# Patient Record
Sex: FEMALE | Race: WHITE | URBAN - METROPOLITAN AREA
[De-identification: names, ages, dates, MRNs, and addresses within clinical notes are randomized per-mention and may not be internally consistent; named-entity substitution may affect disease eponyms.]

---

## 2017-02-01 ENCOUNTER — OFFICE VISIT (OUTPATIENT)
Dept: FAMILY MEDICINE | Facility: CLINIC | Age: 22
End: 2017-02-01
Payer: COMMERCIAL

## 2017-02-01 VITALS — BODY MASS INDEX: 31.22 KG/M2 | RESPIRATION RATE: 16 BRPM | WEIGHT: 184 LBS | TEMPERATURE: 98.5 F

## 2017-02-01 DIAGNOSIS — Z71.84 TRAVEL ADVICE ENCOUNTER: Primary | ICD-10-CM

## 2017-02-01 DIAGNOSIS — Z23 NEED FOR VACCINATION: ICD-10-CM

## 2017-02-01 PROCEDURE — 90471 IMMUNIZATION ADMIN: CPT | Mod: GA | Performed by: NURSE PRACTITIONER

## 2017-02-01 PROCEDURE — 99402 PREV MED CNSL INDIV APPRX 30: CPT | Mod: 25 | Performed by: NURSE PRACTITIONER

## 2017-02-01 PROCEDURE — 90632 HEPA VACCINE ADULT IM: CPT | Mod: GA | Performed by: NURSE PRACTITIONER

## 2017-02-01 RX ORDER — SCOLOPAMINE TRANSDERMAL SYSTEM 1 MG/1
PATCH, EXTENDED RELEASE TRANSDERMAL
Qty: 2 PATCH | Refills: 0 | Status: SHIPPED
Start: 2017-02-01

## 2017-02-01 NOTE — NURSING NOTE
Screening Questionnaire for Adult Immunization    Are you sick today?   No   Do you have allergies to medications, food, a vaccine component or latex?   No   Have you ever had a serious reaction after receiving a vaccination?   No   Do you have a long-term health problem with heart disease, lung disease, asthma, kidney disease, metabolic disease (e.g. diabetes), anemia, or other blood disorder?   No   Do you have cancer, leukemia, HIV/AIDS, or any other immune system problem?   No   In the past 3 months, have you taken medications that affect  your immune system, such as prednisone, other steroids, or anticancer drugs; drugs for the treatment of rheumatoid arthritis, Crohn s disease, or psoriasis; or have you had radiation treatments?   No   Have you had a seizure, or a brain or other nervous system problem?   No   During the past year, have you received a transfusion of blood or blood     products, or been given immune (gamma) globulin or antiviral drug?   No   For women: Are you pregnant or is there a chance you could become        pregnant during the next month?   No   Have you received any vaccinations in the past 4 weeks?   No     Immunization questionnaire answers were all negative.      MNVFC doesn't apply on this patient    Per orders of JENNIFER Torrez, injection of Hep A given by Abbie Rodriguez. Patient instructed to remain in clinic for 20 minutes afterwards, and to report any adverse reaction to me immediately.       Screening performed by Abbie Rodriguez on 2/1/2017 at 1:33 PM.

## 2017-02-01 NOTE — PATIENT INSTRUCTIONS
Today February 1, 2017 you received the    Hepatitis A Vaccine - Please return on 7/31/17 or later for your 2nd and final dose.  .    These appointments can be made as a NURSE ONLY visit.    **It is very important for the vaccinations to be given on the scheduled day(s), this helps ensure you receive the full effectiveness of the vaccine.**    Please call [facility] with any questions [phone]    Thank you for visiting [facility]

## 2017-02-01 NOTE — MR AVS SNAPSHOT
After Visit Summary   2/1/2017    Elida Carrillo    MRN: [id number]           Patient Information     Date Of Birth          1995        Visit Information        Provider Department      2/1/2017 1:00 PM Krista Torrez APRN CNP [facility]        Today's Diagnoses     Travel advice encounter    -  1     Need for vaccination           Care Instructions    Today February 1, 2017 you received the    Hepatitis A Vaccine - Please return on 7/31/17 or later for your 2nd and final dose.  .    These appointments can be made as a NURSE ONLY visit.    **It is very important for the vaccinations to be given on the scheduled day(s), this helps ensure you receive the full effectiveness of the vaccine.**    Please call [facility] with any questions [phone]    Thank you for visiting [facility]            Follow-ups after your visit        Future tests that were ordered for you today     Open Future Orders        Priority Expected Expires Ordered    HEPA VACCINE ADULT IM Routine 8/1/2017 2/1/2018 2/1/2017            Who to contact     If you have questions or need follow up information about today's clinic visit or your schedule please contact [facility] directly at [phone].  Normal or non-critical lab and imaging results will be communicated to you by MyChart, letter or phone within 4 business days after the clinic has received the results. If you do not hear from us within 7 days, please contact the clinic through [employer] or phone. If you have a critical or abnormal lab result, we will notify you by phone as soon as possible.  Submit refill requests through [employer] or call your pharmacy and they will forward the refill request to us. Please allow 3 business days for your refill to be completed.          Additional Information About Your Visit        [employer] Information     [employer] gives you secure access to your electronic  health record. If you see a primary care provider, you can also send messages to your care team and make appointments. If you have questions, please call your primary care clinic.  If you do not have a primary care provider, please call [phone] and they will assist you.        Care EveryWhere ID     This is your Care EveryWhere ID. This could be used by other organizations to access your [place] medical records  [id number]        Your Vitals Were     Temperature Respirations Last Period             98.5  F (36.9  C) (Oral) 16 01/15/2017          Blood Pressure from Last 3 Encounters:   09/29/15 110/64   12/18/14 108/60   01/24/14 98/64    Weight from Last 3 Encounters:   02/01/17 184 lb (83.462 kg)   11/03/15 181 lb 3.2 oz (82.192 kg)   09/29/15 182 lb 1.6 oz (82.6 kg)              We Performed the Following     HEPA VACCINE ADULT IM        Primary Care Provider Office Phone # Fax #    LIZZ Dunlap [facility] [phone]       [facility] [id number] [facility] [id number]        Thank you!     Thank you for choosing [facility]  for your care. Our goal is always to provide you with excellent care. Hearing back from our patients is one way we can continue to improve our services. Please take a few minutes to complete the written survey that you may receive in the mail after your visit with us. Thank you!             Your Updated Medication List - Protect others around you: Learn how to safely use, store and throw away your medicines at www.disposemymeds.org.          This list is accurate as of: 2/1/17  1:19 PM.  Always use your most recent med list.                   Brand Name Dispense Instructions for use    benzoyl peroxide-erythromycin topical gel    BENZAMYCIN    23.3 g    Apply  topically 2 times daily.       BIOTIN PO          hydrocortisone 2.5 % cream    ANUSOL-HC    60 g    Place rectally 2 times daily       norgestim-eth estrad triphasic 0.18/0.215/0.25  MG-35 MCG per tablet    TRINESSA (28)    84 tablet    Take 1 tablet by mouth daily       SUMAtriptan 25 MG tablet    IMITREX    18 tablet    Take 1-2 tablets (25-50 mg) by mouth at onset of headache for migraine May repeat dose in 2 hours.  Do not exceed 200 mg in 24 hours       VITAMIN B 12 PO

## 2017-02-01 NOTE — PROGRESS NOTES
Nurse Note      Itinerary:  [place]      Departure Date: 03/18/2017      Return Date: 03/25/2017      Length of Trip 1 week       Reason for Travel: Tourism           Urban or rural: both      Accommodations: Hotel        IMMUNIZATION HISTORY  Have you received any immunizations within the past 4 weeks?  No  Have you ever fainted from having your blood drawn or from an injection?  No  Have you ever had a fever reaction to vaccination?  No  Have you ever had any bad reaction or side effect from any vaccination?  No  Have you ever had hepatitis A or B vaccine?  Yes  Do you live (or work closely) with anyone who has AIDS, an AIDS-like condition, any other immune disorder or who is on chemotherapy for cancer?  Yes  Do you have a family history of immunodeficiency?  No  Have you received any injection of immune globulin or any blood products during the past 12 months?  No    Patient roomed by CRUZ Baron  Elida JACOB Lorena is a 21 year old female seen today alone for counsultation for international travel to [facility] for [facility].  Patient will be departing in  6 week(s) and staying for   1 week(s) and  traveling with family member(s).      Patient itinerary :  will be in the resort region of [facility]  which presents risk for Dengue Fever, Chikungungya, Zika, food borne illnesses, motor vehicle accidents, Typhoid and Chagas disease. exposure.      Patient's activities will include sightseeing, beach activities (salt water), snorkeling and fishing.    Patient's country of birth is [place]    Special medical concerns: Motion sickness  Pre-travel questionnaire was completed by patient and reviewed by provider.     Vitals: There were no vitals taken for this visit.  BMI= There is no weight on file to calculate BMI.    EXAM:  General:  Well-nourished, well-developed in no acute distress.  Appears to be stated age, interacts appropriately and expresses understanding of information given to  patient.    Current Outpatient Prescriptions   Medication Sig Dispense Refill     norgestim-eth estrad triphasic (TRINESSA, 28,) 0.18/0.215/0.25 MG-35 MCG per tablet Take 1 tablet by mouth daily 84 tablet 0     BIOTIN PO        Cyanocobalamin (VITAMIN B 12 PO)        hydrocortisone (ANUSOL-HC) 2.5 % rectal cream Place rectally 2 times daily 60 g 5     SUMAtriptan (IMITREX) 25 MG tablet Take 1-2 tablets (25-50 mg) by mouth at onset of headache for migraine May repeat dose in 2 hours.  Do not exceed 200 mg in 24 hours 18 tablet 11     benzoyl peroxide-erythromycin (BENZAMYCIN) gel Apply  topically 2 times daily. 23.3 g 3     Patient Active Problem List   Diagnosis     Acne     Irregular periods     CARDIOVASCULAR SCREENING; LDL GOAL LESS THAN 160     Rectal bleeding     Migraine     PCOS (polycystic ovarian syndrome)     BMI 30.0-30.9,adult     Allergies   Allergen Reactions     No Known Drug Allergies          Immunizations discussed include:   Hepatitis A:  Ordered/given today, risks, benefits and side effects reviewed  Hepatitis B: Up to date  Influenza: Up to date  Typhoid: Declined  Not concerned about risk of disease  Rabies: Declined  Not concerned about risk of disease  reviewed managment of a animal bite or scratch (washing wound, seek medical care within 24 hours for post exposure prophylaxis )  Yellow Fever: Not indicated  Japanese Encephalitis: Not indicated  Meningococcus: Not indicated  Tetanus/Diphtheria: Up to date  Measles/Mumps/Rubella: Up to date  Cholera: Not needed  Polio: Up to date  Pneumococcal: Under age of 65  Varicella: Up to date  Zostavax:  Not indicated  HPV:  Declined  Not concerned about risk of disease  TB:  Low risk    [place] Exposure on this trip: No    ASSESSMENT/PLAN:    ICD-10-CM    1. Travel advice encounter Z71.89 HEPA VACCINE ADULT IM     HEPA VACCINE ADULT IM     scopolamine (TRANSDERM) 72 hr patch   2. Need for vaccination Z23 HEPA VACCINE ADULT IM     HEPA VACCINE ADULT IM      I have reviewed general recommendations for safe travel   including: food/water precautions, insect precautions, safer sex   practices given high prevalence of Zika, HIV and other STDs,   roadway safety. Educational materials and [employer] report provided.    [place] prophylaxis recommended: none  Symptomatic treatment for traveler's diarrhea: loperamide/diphenoxylate  Alt[place] illness prevention and treatment: none      Evacuation insurance advised and resources were provided to patient.    Total visit time 30 minutes  with over 50% of time spent counseling patient as detailed above.    Krista Torrez CNP

## 2017-02-01 NOTE — NURSING NOTE
"Chief Complaint   Patient presents with     Travel Clinic     [place]      Temp(Src) 98.5  F (36.9  C) (Oral)  Resp 16  Wt 184 lb (83.462 kg)  LMP 01/15/2017 Estimated body mass index is 31.22 kg/(m^2) as calculated from the following:    Height as of 11/3/15: 5' 4.37\" (1.635 m).    Weight as of this encounter: 184 lb (83.462 kg).  bp completed using cuff size: regular       Health Maintenance addressed:  NONE    n/a    Abbie Rodriguez MA       "

## 2018-12-03 ENCOUNTER — HEALTH MAINTENANCE LETTER (OUTPATIENT)
Age: 23
End: 2018-12-03

## 2020-02-23 ENCOUNTER — HEALTH MAINTENANCE LETTER (OUTPATIENT)
Age: 25
End: 2020-02-23